# Patient Record
Sex: FEMALE | Employment: FULL TIME | URBAN - METROPOLITAN AREA
[De-identification: names, ages, dates, MRNs, and addresses within clinical notes are randomized per-mention and may not be internally consistent; named-entity substitution may affect disease eponyms.]

---

## 2021-04-06 ENCOUNTER — OFFICE VISIT (OUTPATIENT)
Dept: URGENT CARE | Facility: URGENT CARE | Age: 31
End: 2021-04-06
Payer: COMMERCIAL

## 2021-04-06 VITALS
SYSTOLIC BLOOD PRESSURE: 123 MMHG | OXYGEN SATURATION: 99 % | WEIGHT: 152.25 LBS | HEIGHT: 63 IN | DIASTOLIC BLOOD PRESSURE: 72 MMHG | RESPIRATION RATE: 22 BRPM | TEMPERATURE: 100.2 F | HEART RATE: 83 BPM | BODY MASS INDEX: 26.98 KG/M2

## 2021-04-06 DIAGNOSIS — Q89.01 ASPLENIA: ICD-10-CM

## 2021-04-06 DIAGNOSIS — T07.XXXA MULTIPLE ABRASIONS: Primary | ICD-10-CM

## 2021-04-06 PROCEDURE — 90471 IMMUNIZATION ADMIN: CPT | Performed by: FAMILY MEDICINE

## 2021-04-06 PROCEDURE — 90715 TDAP VACCINE 7 YRS/> IM: CPT | Performed by: FAMILY MEDICINE

## 2021-04-06 PROCEDURE — 99204 OFFICE O/P NEW MOD 45 MIN: CPT | Mod: 25 | Performed by: FAMILY MEDICINE

## 2021-04-06 RX ORDER — HYDROCODONE BITARTRATE AND ACETAMINOPHEN 5; 325 MG/1; MG/1
.5-1 TABLET ORAL EVERY 6 HOURS PRN
Qty: 8 TABLET | Refills: 0 | Status: SHIPPED | OUTPATIENT
Start: 2021-04-06 | End: 2021-04-09

## 2021-04-06 SDOH — HEALTH STABILITY: MENTAL HEALTH: HOW OFTEN DO YOU HAVE A DRINK CONTAINING ALCOHOL?: NEVER

## 2021-04-06 SDOH — HEALTH STABILITY: MENTAL HEALTH: HOW OFTEN DO YOU HAVE 6 OR MORE DRINKS ON ONE OCCASION?: NEVER

## 2021-04-06 SDOH — HEALTH STABILITY: MENTAL HEALTH: HOW MANY STANDARD DRINKS CONTAINING ALCOHOL DO YOU HAVE ON A TYPICAL DAY?: NOT ASKED

## 2021-04-06 ASSESSMENT — MIFFLIN-ST. JEOR: SCORE: 1379.73

## 2021-04-06 NOTE — PROGRESS NOTES
"Assessment & Plan     Multiple abrasions  May have slight infection. Given her asplenia. Will treat with augmetin. Close follow up if worsening erythema.   - TDAP VACCINE (Adacel, Boostrix)  [[id number]]  - amoxicillin-clavulanate (AUGMENTIN) 875-125 MG tablet  Dispense: 10 tablet; Refill: 0  - HYDROcodone-acetaminophen (NORCO) 5-325 MG tablet  Dispense: 8 tablet; Refill: 0  We irrigated wht wound copiously. Removed the dead tissue and removed some small pieces of dirt from these wounds as tolerated with topical anestesia. Wound cares discussed and educational info given. Discussed pain meds. Prescribed limited opioids.    Asplenia          [id number]}    Return in about 3 days (around 4/9/2021), or if symptoms worsen or fail to improve.    Prashanth Macedo MD  [facility]    ------------------------------------------------------------------------  Subjective     Gaby Bailey presents to clinic today for the following health issues:  chief complaint  HPI  Fell onto her palms and right knee onto pebbly pavement last night. Painful and seeems swollen and red particulary on her right hand.     The patient has a history of asplenia due to spherocytosis.     Last tetanus about 11.5 years ago.     Review of Systems        Objective    /72   Pulse 83   Temp 100.2  F (37.9  C) (Tympanic)   Resp 22   Ht 1.6 m (5' 3\")   Wt 69.1 kg (152 lb 4 oz)   SpO2 99%   BMI 26.97 kg/m    Physical Exam   GENERAL: healthy, alert and no distress  SKIN: right hand with denuded skin over thenar eminence with swelling. This area is about 2x2cm in size. . This wound was a bit dirty.   left hand with multiple 1-4mm superfical abrasions.   right knee with abrasions scattered. Mildly inflamed and dirty.     All wounds were cleansed.    "

## 2021-04-06 NOTE — LETTER
April 6, 2021      Gaby Bailey  [id number] [address] [address]        To Whom It May Concern,      Gaby Bailey was seen today in clinic. She can return to work on or about April 6, 2021 but only with limited use of her right hand for the next 5-7 days. She needs to be able to keep her hand bandaged, dry and relatively clean.          Sincerely,        Prashanth Macedo MD

## 2021-04-06 NOTE — PATIENT INSTRUCTIONS
Wash the wounds gently 1-2 times daily with warm mildly soapy water. Apply vaseline or other ointment. Apply non stick dressing and bandages on top. You can use ibuprofen for pain. Use the vicodin only if the pain is severe.   Patient Education     Abrasions  Abrasions are skin scrapes. Their treatment depends on how large and deep the abrasion is.   Home care  You may be prescribed an antibiotic cream or ointment to apply to the wound. This helps prevent infection. Follow instructions when using this medicine.   General care    To care for the abrasion, do the following each day for as long as directed by your healthcare provider:  ? If you were given a bandage, change it once a day. If your bandage sticks to the wound, soak it in warm water until it loosens.  ? Wash the area with soap and warm water. You may do this in a sink or under a tub faucet or shower. Rinse off the soap. Then pat the area dry with a clean towel.  ? If antibiotic ointment or cream was prescribed, reapply it to the wound as directed. Cover the wound with a fresh nonstick bandage. If the bandage becomes wet or dirty, change it as soon as possible.  ? Some antibiotic ointments or cream can cause an allergic reaction or dermatitis. This may cause redness, itching and or hives. If this occurs, stop using the ointment right away and wash off any remaining ointment. You may need to take some allergy medicine to relieve symptoms.    You may use acetaminophen or ibuprofen to control pain unless another pain medicine was prescribed. Talk with your healthcare provider before using these medicines if you have chronic liver or kidney disease or ever had a stomach ulcer or GI (gastrointestinal) bleeding. Don t use ibuprofen in children younger than 6 months old.    Most skin wounds heal within 10 days. But an infection may occur even with treatment. So it s important to watch the wound for signs of infection as listed below.    Follow-up care  Follow up  with your healthcare provider, or as advised.  When to get medical advice  Call your healthcare provider right away if any of these occur:    Fever of 100.4 F (38 C) or higher, or as directed by your healthcare provider    Increasing pain, redness, swelling, or drainage from the wound    Bleeding from the wound that does not stop after a few minutes of steady, firm pressure    Decreased ability to move any body part near the wound  [employer] last reviewed this educational content on 8/1/2019 2000-2020 [employer]. All rights reserved. This information is not intended as a substitute for professional medical care. Always follow your healthcare professional's instructions.